# Patient Record
Sex: MALE | Race: WHITE | NOT HISPANIC OR LATINO | Employment: FULL TIME | URBAN - METROPOLITAN AREA
[De-identification: names, ages, dates, MRNs, and addresses within clinical notes are randomized per-mention and may not be internally consistent; named-entity substitution may affect disease eponyms.]

---

## 2018-04-09 ENCOUNTER — OFFICE VISIT (OUTPATIENT)
Dept: BARIATRICS | Facility: CLINIC | Age: 28
End: 2018-04-09

## 2018-04-09 VITALS
WEIGHT: 315 LBS | BODY MASS INDEX: 41.75 KG/M2 | SYSTOLIC BLOOD PRESSURE: 110 MMHG | HEIGHT: 73 IN | HEART RATE: 70 BPM | DIASTOLIC BLOOD PRESSURE: 80 MMHG | TEMPERATURE: 97.9 F

## 2018-04-09 DIAGNOSIS — E66.01 MORBID (SEVERE) OBESITY DUE TO EXCESS CALORIES (HCC): ICD-10-CM

## 2018-04-09 DIAGNOSIS — E66.9 OBESITY, CLASS I, BMI 30-34.9: Primary | ICD-10-CM

## 2018-04-09 DIAGNOSIS — E66.01 MORBID (SEVERE) OBESITY DUE TO EXCESS CALORIES (HCC): Primary | ICD-10-CM

## 2018-04-09 DIAGNOSIS — Z98.84 BARIATRIC SURGERY STATUS: ICD-10-CM

## 2018-04-09 PROCEDURE — RECHECK

## 2018-04-09 RX ORDER — ESCITALOPRAM OXALATE 10 MG/1
10 TABLET ORAL
COMMUNITY
Start: 2018-03-08 | End: 2019-07-15

## 2018-04-09 RX ORDER — ESOMEPRAZOLE MAGNESIUM 40 MG/1
40 CAPSULE, DELAYED RELEASE ORAL
Refills: 3 | COMMUNITY
Start: 2018-03-08

## 2018-04-09 RX ORDER — LISINOPRIL 10 MG/1
TABLET ORAL
COMMUNITY
Start: 2018-04-03

## 2018-04-09 NOTE — PROGRESS NOTES
Bariatric Behavioral Health Evaluation    Presenting Problem  Patient presents today with concerns to increase overall health, increase mobility, decrease weight and prevent family disease  Is the patient seeking Bariatric Surgery Eval? Yes  If yes how long have you researched this surgery option  Patient has been thinking about surgery for 3 years; patient's brother in law and good friend had surgery and had good results  Realizes Post- Op Requirements? Yes patient has hypertension  Pre-morbid level of function and history of present illness: Multiple health issues  Psychiatric/Psychological Treatment Diagnosis: Patient reports Axis I diagnosis of anxiety  Patient treated by PCP with medication  Outpatient Counselor No     Psychiatrist no  Have you had Inpatient Treatment? No    Family Constellation (include relationship with each and Psych/Med HX)    Father history of obesity and alcohol; sister history of obesity  Domestic Violence No    Additional comments/stressors related to family/relationships/peer support has supportive family and friends; house hunting  Physical/Psychological Assessment:     Appearance: appropriate  Sociability: average  Affect: appropriate  Mood: calm  Thought Process: coherent  Speech: normal  Content: no impairment  Orientation: person  Yes , place  Yes , time  Yes , normal attention span  Yes , normal memory  Yes   and normal judgement  Yes   Insight: emotional  good    Risk Assessment:     none    Recommendations: Patient is recommended for surgery  Risk of Harm to Self or Others: None reported  Observation:     Interviews this interview only  Access to weapons yes     Weapons secured by Secure in gun safe with lock and key       Based on the previous information, the client presents the following risk of harm to self or others: low    BARIATRIC [place]    I have received education related to my bariatric surgery process and understand:    Patients may be required to complete a psychiatric evaluation and receive clearance for surgery from their psychiatrist     Patients who undergo weight loss surgery are at higher risk of increased mental health concerns and suicide attempts  Patients may be required to complete a full substance abuse evaluation and then complete all treatment recommendations prior to surgery  If diagnosis of abuse/dependence results, patient may be required to remain sober for one (1) year before having bariatric surgery  Patients on psychiatric medications should check with their provider to discuss psychiatric medications and the changes in absorption  Patient should discuss all time release medications with provider and take all medications as prescribed  The recommendation is that there is no use of  any tobacco products, Hookah or  vapes for the bariatric post-operation patient  Bariatric surgery patients should not consume alcohol as a post-operative patient as it may increase risk of numerous health conditions including but not limited to alcohol abuse and ulcers  There is a possibility of weight regain if patient does not follow all program guidelines and recommendations  Bariatric surgery patients should exercise thirty (30) to sixty (60) minutes per day to maintain post-surgical weight loss  Research indicates that bariatric patients are more successful when they see a therapist for up to two (2) years post-op  Patients will follow all medical and dietary recommendations provided  Patient will keep all scheduled appointments and follow up with their physician for a minimum of five (5) years  Patient will take all vitamins as recommended  Post-operative vitamins are life-long  Patient reviewed Bariatric Surgery Education Checklist and agrees they have received education on these issues       Note Patient reports Axis I diagnosis of anxiety that is treated by his PCP with medication  Patient educated the impact of nicotine and alcohol on the post bariatric surgery patient  Patient has a positive history of obesity and alcohol  Patient meets criteria for surgery at this program and is referred to the physician

## 2018-04-09 NOTE — PROGRESS NOTES
Bariatric Nutrition Assessment Note    Type of surgery    Gastric bypass: laparoscopic  Surgery Date: TBD  Surgeon: TBD    Nutrition Assessment   Moe Lance  32 y o   male     Wt with BMI of 25: 186 6  Pre-Op Excess Wt: 163 8  350 4lbs 6'  5"    Weight History   Onset of Obesity: Childhood  Family history of obesity: Yes  Wt Loss Attempts: Commercial Programs ([employer], [employer], etc )  Exercise  High Protein/Low CHO diets (Atkins, Union, etc )  Self Created Diets (Portion Control, Healthy Food Choices, etc )  Maximum Wt Lost: -25lbs    Review of History and Medications   No past medical history on file  No past surgical history on file  Social History     Social History    Marital status: /Civil Union     Spouse name: N/A    Number of children: N/A    Years of education: N/A     Social History Main Topics    Smoking status: Not on file    Smokeless tobacco: Not on file    Alcohol use Not on file    Drug use: Unknown    Sexual activity: Not on file     Other Topics Concern    Not on file     Social History Narrative    No narrative on file     No current outpatient prescriptions on file    Food Intake and Lifestyle Assessment   Food Intake Assessment completed via food log brought by patient  Breakfast: 2 eggs, 1 slice of whole wheat toast with almond butter and 1/2 banana  Snack: carrots and hummus   Lunch: 2 pieces of bread with turkey and 1/2 banana or salad with chicken and dressing and 1/2 banana or chicken caesar wrap from subway  Snack: fruit  Dinner: protein (fish or chicken or ground turkey), starch (whole wheat pasta or rice or mashed potatoes) and veggie (broccoli, green beans, corn)  Snack: nuts  Beverage intake: only seltzer water  Protein supplement: none at this time  Estimated protein intake per day: >80gm   Estimated fluid intake per day: >64oz of seltzer water  Meals eaten away from home: 2 per month  Typical meal pattern: 3 meals per day and 3 snacks per day  Eating Behaviors: Consumption of high calorie beverages on occasion will have a body armor drink (200 cals/8oz), Large portion sizes and Craves salty foods  Food allergies or intolerances: Allergies not on file  Cultural or Sikhism considerations: n/a    Physical Assessment  Physical Activity  Types of exercise: Walking (getting between 5000-11,000 steps per day  Current physical limitations: none    Psychosocial Assessment   Support systems: spouse friend(s) relative(s)  Socioeconomic factors: none    Nutrition Diagnosis  Diagnosis: Overweight / Obesity (NC-3 3), Excessive energy intake (NI-1 5) and Inappropriate intake of carbohydrates (NI-5 8  3)  Related to: Physical inactivity and Excessive energy intake  As Evidenced by: BMI >25 and Excessive energy intake     Nutrition Prescription: Recommend the following diet  Regular    Interventions and Teaching   Discussed pre-op and post-op nutrition guidelines  Patient educated and handouts provided    Surgical changes to stomach / GI  Capacity of post-surgery stomach  Diet progression  Adequate hydration  Sugar and fat restriction to decrease "dumping syndrome"  Expected weight loss  Weight loss plateaus/ possibility of weight regain  Exercise  Suggestions for pre-op diet  Meal planning and preparation  Appropriate carbohydrate, protein, and fat intake, and food/fluid choices to maximize safe weight loss, nutrient intake, and tolerance   Dietary and lifestyle changes  Possible problems with poor eating habits  Techniques for self monitoring and keeping daily food journal  Potential for food intolerance after surgery, and ways to deal with them including: lactose intolerance, nausea, reflux, vomiting, diarrhea, food intolerance, appetite changes, gas  Vitamin / Mineral supplementation of Multivitamin with minerals and Vitamin D    Education provided to: patient    Barriers to learning: No barriers identified  Readiness to change: preparation    Prior research on procedure: internet, discussed with provider and friends or family    Comprehension: verbalizes understanding     Expected Compliance: good  Recommendations  Pt is an appropriate candidate for surgery   Yes    Evaluation / Monitoring  Dietitian to Monitor: Eating pattern as discussed Tolerance of nutrition prescription Body weight Physical activity    Goals  Food journal, Exercise 30 minutes 5 times per week, Complete lession plans 1-6, Eat 3 meals per day and Eliminate mindless snacking    Time Spent:   1 Hour

## 2018-05-18 ENCOUNTER — OFFICE VISIT (OUTPATIENT)
Dept: BARIATRICS | Facility: CLINIC | Age: 28
End: 2018-05-18
Payer: COMMERCIAL

## 2018-05-18 VITALS
DIASTOLIC BLOOD PRESSURE: 74 MMHG | SYSTOLIC BLOOD PRESSURE: 130 MMHG | WEIGHT: 315 LBS | BODY MASS INDEX: 41.75 KG/M2 | TEMPERATURE: 98.5 F | HEIGHT: 73 IN | HEART RATE: 75 BPM

## 2018-05-18 DIAGNOSIS — E66.01 MORBID (SEVERE) OBESITY DUE TO EXCESS CALORIES (HCC): Primary | ICD-10-CM

## 2018-05-18 DIAGNOSIS — K21.00 GASTROESOPHAGEAL REFLUX DISEASE WITH ESOPHAGITIS: ICD-10-CM

## 2018-05-18 DIAGNOSIS — I10 ESSENTIAL HYPERTENSION: ICD-10-CM

## 2018-05-18 DIAGNOSIS — R73.03 PRE-DIABETES: ICD-10-CM

## 2018-05-18 PROCEDURE — 99204 OFFICE O/P NEW MOD 45 MIN: CPT | Performed by: SURGERY

## 2018-05-18 RX ORDER — MULTIVIT WITH MINERALS/LUTEIN
1000 TABLET ORAL DAILY
COMMUNITY

## 2018-05-18 NOTE — LETTER
May 18, 2018     Liam Asencio, [address] [facility] [address] [address]    Patient: Radha Shea   YOB: 1990   Date of Visit: 5/18/2018       Dear Dr Neetu Caraballo: Thank you for referring Radha Shea to me for evaluation  Below are my notes for this consultation  If you have questions, please do not hesitate to call me  I look forward to following your patient along with you  Sincerely,        Jossy Jones MD        CC: No Recipients  Jossy Jones MD  5/18/2018  1:34 PM  Sign at close encounter      [address] [facility] [address] [facility] 32 y o  male MRN: [id number]  Unit/Bed#:  Encounter: [id number]      HPI:  Radha Shea is a 32 y o  male who presents with a longstanding history of morbid obesity and inability to sustain a meaningful weight loss  Here today to discuss bariatric options  Visit type: initial visit    Symptoms: inability to loss weight    Associated Symptoms: anxiety    Associated Conditions: abdominal obesity  Disease Complications: none  Weight Loss Interest: high  Previous Diet Trials: low calorie     Exercise Frequency:sedentary  Types of Exercise: walking    Review of Systems   All other systems reviewed and are negative    GI:  He has pretty severe heartburn    Historical Information   Past Medical History:   Diagnosis Date    Anxiety     Digestive disorder     Hypertension      Past Surgical History:   Procedure Laterality Date    COLONOSCOPY      CYST REMOVAL       Social History   History   Alcohol Use    Yes     History   Drug Use No     History   Smoking Status    Former Smoker   Smokeless Tobacco    Former User     Family History: non-contributory    Meds/Allergies   all medications and allergies reviewed  Allergies   Allergen Reactions    Cephalexin Other (See Comments) and Swelling     "swelling of hands & arms & hot flashes "       Objective     Current Vitals:   Blood Pressure: 130/74 (05/18/18 1309)  Pulse: 75 (05/18/18 1309)  Temperature: 98 5 °F (36 9 °C) (05/18/18 1309)  Height: 6' 0 5" (184 2 cm) (05/18/18 1309)  Weight - Scale: (!) 157 kg (345 lb 8 oz) (05/18/18 1309)    [unfilled]    Invasive Devices          No matching active lines, drains, or airways          Physical Exam   Constitutional: He is oriented to person, place, and time  He appears well-developed and well-nourished  No distress  Neurological: He is alert and oriented to person, place, and time  Psychiatric: He has a normal mood and affect  His behavior is normal  Judgment and thought content normal        Lab Results: I have personally reviewed pertinent lab results  Imaging: I have personally reviewed pertinent reports  EKG, Pathology, and Other Studies: I have personally reviewed pertinent reports  An EGD was performed a couple months ago and revealed -esophagus: ss Fields's 39 to 40 cm, no esophagitis, otherwise normal  -stomach: normal   -duodenum: normal  A   DISTAL ESOPHAGUS BIOPSY:    Gastric mucosa with mild chronic inflammation     No intestinal (goblet cell) metaplasia or dysplasia seen     Adjacent squamous mucosa with rare intraepithelial eosinophils seen  (up to 1 per high-power field)  Assessment/PLAN:    32 y o  yo male with a long standing h/o of obesity and inability to sustain any meaningful weight loss on his own despite several attempts  He is interested in the Laparoscopic Gastric Bypass  As a part of his pre op evaluation, he will be referred to a cardiologist and for a sleep evaluation and consult  I have spent over 45 minutes with him face to face in the office today discussing his options and details of the surgery  We have seen an animation of the surgery on the computer that illustrates how the operation is done and how the anatomy will be altered with the procedure  Over 50% of this was coordinating care      I have discussed and educated the patient with regards to the components of our multidisciplinary program and the importance of compliance and follow up in the post operative period  He was given the opportunity to ask questions and I have answered all of them  The patient was also instructed with regards to the importance of behavior modification, nutritional counseling, support meeting attendance and lifestyle changes that are important to ensure success  Although there is a great statistical chance of improvement or even resolution of most of his associated comorbidities, the results vary from patient to patient and they largely depend on his commitment and compliance  He needs to lose 17 lbs prior to the operation        Kody Bose MD  5/18/2018  1:30 PM

## 2018-05-18 NOTE — PROGRESS NOTES
BARIATRIC INITIAL CONSULT - BARIATRIC SURGERY    Dia Lance 32 y o  male MRN: [id number]  Unit/Bed#:  Encounter: [id number]      HPI:  Samantha Hayden is a 32 y o  male who presents with a longstanding history of morbid obesity and inability to sustain a meaningful weight loss  Here today to discuss bariatric options  Visit type: initial visit    Symptoms: inability to loss weight    Associated Symptoms: anxiety    Associated Conditions: abdominal obesity  Disease Complications: none  Weight Loss Interest: high  Previous Diet Trials: low calorie     Exercise Frequency:sedentary  Types of Exercise: walking    Review of Systems   All other systems reviewed and are negative  GI:  He has pretty severe heartburn    Historical Information   Past Medical History:   Diagnosis Date    Anxiety     Digestive disorder     Hypertension      Past Surgical History:   Procedure Laterality Date    COLONOSCOPY      CYST REMOVAL       Social History   History   Alcohol Use    Yes     History   Drug Use No     History   Smoking Status    Former Smoker   Smokeless Tobacco    Former User     Family History: non-contributory    Meds/Allergies   all medications and allergies reviewed  Allergies   Allergen Reactions    Cephalexin Other (See Comments) and Swelling     "swelling of hands & arms & hot flashes "       Objective     Current Vitals:   Blood Pressure: 130/74 (05/18/18 1309)  Pulse: 75 (05/18/18 1309)  Temperature: 98 5 °F (36 9 °C) (05/18/18 1309)  Height: 6' 0 5" (184 2 cm) (05/18/18 1309)  Weight - Scale: (!) 157 kg (345 lb 8 oz) (05/18/18 1309)    [unfilled]    Invasive Devices          No matching active lines, drains, or airways          Physical Exam   Constitutional: He is oriented to person, place, and time  He appears well-developed and well-nourished  No distress  Neurological: He is alert and oriented to person, place, and time  Psychiatric: He has a normal mood and affect   His behavior is normal  Judgment and thought content normal        Lab Results: I have personally reviewed pertinent lab results  Imaging: I have personally reviewed pertinent reports  EKG, Pathology, and Other Studies: I have personally reviewed pertinent reports  An EGD was performed a couple months ago and revealed -esophagus: ss Fields's 39 to 40 cm, no esophagitis, otherwise normal  -stomach: normal   -duodenum: normal  A   DISTAL ESOPHAGUS BIOPSY:    Gastric mucosa with mild chronic inflammation     No intestinal (goblet cell) metaplasia or dysplasia seen     Adjacent squamous mucosa with rare intraepithelial eosinophils seen  (up to 1 per high-power field)  Assessment/PLAN:    32 y o  yo male with a long standing h/o of obesity and inability to sustain any meaningful weight loss on his own despite several attempts  He is interested in the Laparoscopic Gastric Bypass  As a part of his pre op evaluation, he will be referred to a cardiologist and for a sleep evaluation and consult  I have spent over 45 minutes with him face to face in the office today discussing his options and details of the surgery  We have seen an animation of the surgery on the computer that illustrates how the operation is done and how the anatomy will be altered with the procedure  Over 50% of this was coordinating care  I have discussed and educated the patient with regards to the components of our multidisciplinary program and the importance of compliance and follow up in the post operative period  He was given the opportunity to ask questions and I have answered all of them  The patient was also instructed with regards to the importance of behavior modification, nutritional counseling, support meeting attendance and lifestyle changes that are important to ensure success      Although there is a great statistical chance of improvement or even resolution of most of his associated comorbidities, the results vary from patient to patient and they largely depend on his commitment and compliance  He needs to lose 17 lbs prior to the operation        Brianne Morales MD  5/18/2018  1:30 PM

## 2018-06-26 ENCOUNTER — OFFICE VISIT (OUTPATIENT)
Dept: SLEEP CENTER | Facility: CLINIC | Age: 28
End: 2018-06-26
Payer: COMMERCIAL

## 2018-06-26 VITALS
DIASTOLIC BLOOD PRESSURE: 68 MMHG | HEIGHT: 73 IN | SYSTOLIC BLOOD PRESSURE: 134 MMHG | HEART RATE: 70 BPM | WEIGHT: 315 LBS | BODY MASS INDEX: 41.75 KG/M2

## 2018-06-26 DIAGNOSIS — Z91.89 AT RISK FOR OBSTRUCTIVE SLEEP APNEA: Primary | ICD-10-CM

## 2018-06-26 DIAGNOSIS — E66.01 MORBID (SEVERE) OBESITY DUE TO EXCESS CALORIES (HCC): ICD-10-CM

## 2018-06-26 DIAGNOSIS — F41.9 ANXIETY: ICD-10-CM

## 2018-06-26 DIAGNOSIS — I10 ESSENTIAL HYPERTENSION: ICD-10-CM

## 2018-06-26 DIAGNOSIS — K21.9 GASTROESOPHAGEAL REFLUX DISEASE, ESOPHAGITIS PRESENCE NOT SPECIFIED: ICD-10-CM

## 2018-06-26 PROCEDURE — 99244 OFF/OP CNSLTJ NEW/EST MOD 40: CPT | Performed by: INTERNAL MEDICINE

## 2018-06-26 NOTE — PROGRESS NOTES
Review of Systems      Genitourinary none   Cardiology none   Gastrointestinal none   Neurology awaken with headache   Constitutional none   Integumentary none   Psychiatry anxiety   Musculoskeletal legs twitching/jerking   Pulmonary none   ENT none   Endocrine none   Hematological none

## 2018-06-26 NOTE — PROGRESS NOTES
Consultation - [address]  32 y o  male  :1990  EEI:[id number]    Physician Requesting Consult: Chon Rust MD     Reason for Consult : [At your kind request] I saw this patient for initial sleep evaluation today  The patient is planning Bariatric surgery and is here to also evaluate for Obstructive Sleep Apnea  PFSH, Problem List, Medications & Allergies were reviewed in EMR  He  has a past medical history of Anxiety; Digestive disorder; and Hypertension  He has a current medication list which includes the following prescription(s): vitamin c, cholecalciferol, cyanocobalamin, escitalopram, esomeprazole, krill oil, lisinopril, and multiple vitamins-minerals  HPI:  He reports no snoring or breathing difficulties during sleep  Other Complaints:  Morning headaches butInfrequently at 1-2 times a month  Restless Leg Syndrome:reports no typical symptoms that are suggestive, but has twitching of his body as he falls asleep and at times may awaken him from sleep  Parasomnia: [No features of parasomnia ]  Sleep Routine: [Is regular ] Bedtime typically between 11:00 p m  and awakens 8:00 a m  He usually falls asleep in < 15 minutes  Sleep is interrupted 2 x / night  [He is spending approximately 9 hours in bed and estimates getting 8 - 9 hours sleep ] He awakens with the aid of an alarm feeling refreshed  He  denies excessive daytime drowsiness [ ]  He self rated at Total score: 3 /24 on the Opdyke Sleepiness Scale  Habits:[ reports that he has quit smoking  He has quit using smokeless tobacco ], [ reports that he drinks alcohol ], [ reports that he does not use drugs  ], Caffeine use: none, Exercise routine: regular by walking  Family History:   Several famil members have body twitches but no diagnosis of restless leg syndrome  ROS: reviewed & as attached  [Significant for approximately 20 lb weight gain in the past 1 year    He feels anxiety and acid reflux are controlled on current medication ]     EXAM: key  [x] system is Normal  [] see notes  VITALS     []  /68   Pulse 70   Ht 6' 0 5" (1 842 m)   Wt (!) 164 kg (362 lb)   BMI 48 42 kg/m²     GENERAL[x]  [Well] groomed male, well appearing, [at] his stated age, in [no apparent] distress  PSYCH     [x]  Alert, orientated and cooperative  Speech is clear and coherent  Mental state  appears normal    EXTREM/  SKIN         [x]  [No] pedal edema  Skin is warm and dry  Color and hydration are good  HEAD       [x]     Craniofacial anatomy: normal]  EOMI  TMJ & Sinuses are normal    Neck         []  [Neck Circumference: 43 (cm) cm], appears thick and muscular; [no] abnormal masses [or] Lymhadenopathy  Thyroid is [normal]  Trachea is [central]  Nasal        []  Airway is patent Septum is [central], Mucous membranes appear [normal]  Turbinates are [normal ] There is [no] rhinorrhea  Oral          []    Airway       [Is crowded,] Modified Mallampati class IV (only hard palate visible)  Palate:  redundant soft palate  There is asymmetric tonsillar hypertrophy 2+ on the left  Teeth normal      CVS         [x]  Heart sounds are [regular], [No] murmur[s]  RESP       [x]  Effort is [normal]  Air entry [good] [bilaterally]  [No]wheezes  [No]rales  ABD         [x]  obese, soft & non-tender  CNS         [x]  Grossly intact  Normal gait  Rombergs [Negative]  MSK         [x]  Muscle bulk, tone and power  [normal]          IMPRESSION:   1  At risk for obstructive sleep apnea  Home Study   2  Morbid (severe) obesity due to excess calories [facility])  Ambulatory referral to Sleep Medicine    Home Study   3  Essential hypertension     4  Anxiety     5  Gastroesophageal reflux disease, esophagitis presence not specified          PLAN:   1   With respect to above conditions, I counseled on pathophysiology, diagnosis, treatment options, risks and benefits; interrelationship and effects on symptoms and comorbidities; risks of no treatment; costs and insurance aspects  2  I advised on weight reduction, avoiding sleeping supine, using alcohol or sedating medications close to bed time and on safe driving practices  3  Nocturnal polysomnography is indicated and a diagnostic home sleep study will be scheduled  4  Patient is averse to trying Positive airway pressure therapy  5  Follow-up will be scheduled after the study to review results, further details of treatment options and to initiate/adjust therapy  Thank you for allowing me to participate in the care of this patient  I will keep you apprised of developments      Sincerely,     Authenticated electronically by Leslie Nelson MD   on 16/92/78   Board Certified Specialist

## 2018-08-10 ENCOUNTER — HOSPITAL ENCOUNTER (OUTPATIENT)
Dept: SLEEP CENTER | Facility: CLINIC | Age: 28
Discharge: HOME | End: 2018-08-10
Payer: COMMERCIAL

## 2018-08-10 DIAGNOSIS — Z91.89 AT RISK FOR OBSTRUCTIVE SLEEP APNEA: ICD-10-CM

## 2018-08-10 DIAGNOSIS — E66.01 MORBID (SEVERE) OBESITY DUE TO EXCESS CALORIES (HCC): ICD-10-CM

## 2018-08-10 PROCEDURE — G0399 HOME SLEEP TEST/TYPE 3 PORTA: HCPCS

## 2018-08-15 ENCOUNTER — TELEPHONE (OUTPATIENT)
Dept: SLEEP CENTER | Facility: CLINIC | Age: 28
End: 2018-08-15

## 2018-08-15 NOTE — TELEPHONE ENCOUNTER
Left message to keep follow up on 8/28 as scheduled to discuss results of sleep study  Instructed to call if any questions prior to that appointment

## 2019-07-15 ENCOUNTER — OFFICE VISIT (OUTPATIENT)
Dept: URGENT CARE | Facility: MEDICAL CENTER | Age: 29
End: 2019-07-15
Payer: COMMERCIAL

## 2019-07-15 VITALS
TEMPERATURE: 98.1 F | BODY MASS INDEX: 41.75 KG/M2 | SYSTOLIC BLOOD PRESSURE: 139 MMHG | OXYGEN SATURATION: 98 % | HEIGHT: 73 IN | WEIGHT: 315 LBS | HEART RATE: 105 BPM | RESPIRATION RATE: 16 BRPM | DIASTOLIC BLOOD PRESSURE: 79 MMHG

## 2019-07-15 DIAGNOSIS — J06.9 ACUTE URI: Primary | ICD-10-CM

## 2019-07-15 DIAGNOSIS — K52.9 NONINFECTIOUS GASTROENTERITIS, UNSPECIFIED TYPE: ICD-10-CM

## 2019-07-15 PROCEDURE — G0382 LEV 3 HOSP TYPE B ED VISIT: HCPCS | Performed by: FAMILY MEDICINE

## 2019-07-15 RX ORDER — ONDANSETRON 4 MG/1
4 TABLET, ORALLY DISINTEGRATING ORAL EVERY 6 HOURS PRN
Qty: 20 TABLET | Refills: 0 | Status: SHIPPED | OUTPATIENT
Start: 2019-07-15

## 2019-07-15 RX ORDER — DIPHENOXYLATE HYDROCHLORIDE AND ATROPINE SULFATE 2.5; .025 MG/1; MG/1
1 TABLET ORAL 4 TIMES DAILY PRN
Qty: 30 TABLET | Refills: 0 | Status: SHIPPED | OUTPATIENT
Start: 2019-07-15

## 2019-07-15 RX ORDER — BENZONATATE 100 MG/1
100 CAPSULE ORAL 3 TIMES DAILY PRN
Qty: 20 CAPSULE | Refills: 0 | Status: SHIPPED | OUTPATIENT
Start: 2019-07-15

## 2019-07-15 NOTE — LETTER
July 15, 2019     Patient: Dipika Nuñez   YOB: 1990   Date of Visit: 7/15/2019       To Whom It May Concern: It is my medical opinion that Dipika Nuñez may return to work on 7/17/2019  If you have any questions or concerns, please don't hesitate to call           Sincerely,        Adrian Perry MD    CC: No Recipients

## 2019-07-16 NOTE — PROGRESS NOTES
[facility]        NAME: Dhruv Salter is a 29 y o  male  : 1990    MRN: [id number]  DATE: July 15, 2019  TIME: 9:24 PM    Assessment and Plan   Acute URI [J06 9]  1  Acute URI  benzonatate (TESSALON PERLES) 100 mg capsule   2  Noninfectious gastroenteritis, unspecified type  ondansetron (ZOFRAN-ODT) 4 mg disintegrating tablet    diphenoxylate-atropine (LOMOTIL) 2 5-0 025 mg per tablet         Patient Instructions       Follow up with PCP in 3-5 days  Proceed to  ER if symptoms worsen  Chief Complaint     Chief Complaint   Patient presents with    Diarrhea     diarrhea with nasal congestion and cough  states 12 pound weight loss since friday         History of Present Illness       Patient with 3 day history of loose watery diarrhea  Loose watery up to 10 episodes in a day not accompanied by blood or mucus  Denies any abdominal pain  Refers to some mild nausea but no vomiting  He has been tolerating fluids quite well  He believes he has lost at least 12 lb in the last 3 days  He is also here with URI symptoms mainly consisting of nasal congestion, some postnasal drip and nonproductive cough denies shortness of breath or wheeze  He is taking no medication for his current symptoms  Denies any recent sick exposures  Review of Systems   Review of Systems   Constitutional: Negative  HENT: Positive for congestion  Respiratory: Positive for cough  Gastrointestinal: Positive for diarrhea and nausea           Current Medications       Current Outpatient Medications:     Ascorbic Acid (VITAMIN C) 1000 MG tablet, Take 1,000 mg by mouth daily, Disp: , Rfl:     Cholecalciferol (VITAMIN D PO), Take by mouth, Disp: , Rfl:     Cyanocobalamin (VITAMIN B-12 PO), Take by mouth, Disp: , Rfl:     escitalopram (LEXAPRO) 10 mg tablet, Take 10 mg by mouth, Disp: , Rfl:     esomeprazole (NexIUM) 40 MG capsule, Take 40 mg by mouth daily before breakfast, Disp: , Rfl: 3    KRILL OIL PO, Take by mouth, Disp: , Rfl:     lisinopril (ZESTRIL) 10 mg tablet, , Disp: , Rfl:     Multiple Vitamins-Minerals (MULTI COMPLETE PO), Take by mouth, Disp: , Rfl:     benzonatate (TESSALON PERLES) 100 mg capsule, Take 1 capsule (100 mg total) by mouth 3 (three) times a day as needed for cough, Disp: 20 capsule, Rfl: 0    diphenoxylate-atropine (LOMOTIL) 2 5-0 025 mg per tablet, Take 1 tablet by mouth 4 (four) times a day as needed for diarrhea, Disp: 30 tablet, Rfl: 0    ondansetron (ZOFRAN-ODT) 4 mg disintegrating tablet, Take 1 tablet (4 mg total) by mouth every 6 (six) hours as needed for nausea or vomiting, Disp: 20 tablet, Rfl: 0    Current Allergies     Allergies as of 07/15/2019 - Reviewed 07/15/2019   Allergen Reaction Noted    Cephalexin Other (See Comments) and Swelling 09/26/2017            The following portions of the patient's history were reviewed and updated as appropriate: allergies, current medications, past family history, past medical history, past social history, past surgical history and problem list      Past Medical History:   Diagnosis Date    Anxiety     Digestive disorder     Hypertension        Past Surgical History:   Procedure Laterality Date    COLONOSCOPY      CYST REMOVAL         Family History   Problem Relation Age of Onset    Hypertension Father          Medications have been verified  Objective   /79   Pulse 105   Temp 98 1 °F (36 7 °C) (Tympanic)   Resp 16   Ht 6' 1" (1 854 m)   Wt (!) 156 kg (343 lb)   SpO2 98%   BMI 45 25 kg/m²        Physical Exam     Physical Exam   HENT:   Nose: Nose normal    Mouth/Throat: Oropharynx is clear and moist    Neck: Normal range of motion  Cardiovascular: Normal rate, regular rhythm and normal heart sounds  Pulmonary/Chest: Effort normal and breath sounds normal    Abdominal: Soft  Bowel sounds are normal    Vitals reviewed

## 2019-07-16 NOTE — PATIENT INSTRUCTIONS
I prescribed Zofran 4 mg ODT q 6 hours p r n , Lomotil tablets as needed for diarrhea and Tessalon Perles 100 mg q 8 hours  Patient advised to increase fluid intake maintain clear liquid diet for 24 hours before advancing as tolerated  Gastroenteritis   WHAT YOU NEED TO KNOW:   Gastroenteritis, or stomach flu, is an infection of the stomach and intestines  DISCHARGE INSTRUCTIONS:   Call 911 for any of the following:   · You have trouble breathing or a very fast pulse  Return to the emergency department if:   · You see blood in your diarrhea  · You cannot stop vomiting  · You have not urinated for 12 hours  · You feel like you are going to faint  Contact your healthcare provider if:   · You have a fever  · You continue to vomit or have diarrhea, even after treatment  · You see worms in your diarrhea  · Your mouth or eyes are dry  You are not urinating as much or as often  · You have questions or concerns about your condition or care  Medicines:   · Medicines  may be given to stop vomiting or diarrhea, decrease abdominal cramps, or treat an infection  · Take your medicine as directed  Contact your healthcare provider if you think your medicine is not helping or if you have side effects  Tell him or her if you are allergic to any medicine  Keep a list of the medicines, vitamins, and herbs you take  Include the amounts, and when and why you take them  Bring the list or the pill bottles to follow-up visits  Carry your medicine list with you in case of an emergency  Manage your symptoms:   · Drink liquids as directed  Ask your healthcare provider how much liquid to drink each day, and which liquids are best for you  You may also need to drink an oral rehydration solution (ORS)  An ORS has the right amounts of sugar, salt, and minerals in water to replace body fluids  · Eat bland foods  When you feel hungry, begin eating soft, bland foods   Examples are bananas, clear soup, potatoes, and applesauce  Do not have dairy products, alcohol, sugary drinks, or drinks with caffeine until you feel better  · Rest as much as possible  Slowly start to do more each day when you begin to feel better  Prevent the spread of gastroenteritis:  Gastroenteritis can spread easily  Keep yourself, your family, and your surroundings clean to help prevent the spread of gastroenteritis:  · Wash your hands often  Use soap and water  Wash your hands after you use the bathroom, change a child's diapers, or sneeze  Wash your hands before you prepare or eat food  · Clean surfaces and do laundry often  Wash your clothes and towels separately from the rest of the laundry  Clean surfaces in your home with antibacterial  or bleach  · Clean food thoroughly and cook safely  Wash raw vegetables before you cook  Cook meat, fish, and eggs fully  Do not use the same dishes for raw meat as you do for other foods  Refrigerate any leftover food immediately  · Be aware when you camp or travel  Drink only clean water  Do not drink from rivers or lakes unless you purify or boil the water first  When you travel, drink bottled water and do not add ice  Do not eat fruit that has not been peeled  Do not eat raw fish or meat that is not fully cooked  Follow up with your healthcare provider as directed:  Write down your questions so you remember to ask them during your visits  © 2017 [employer] Information is for End User's use only and may not be sold, redistributed or otherwise used for commercial purposes  All illustrations and images included in CareNotes® are the copyrighted property of A D A M [employer] Inc  or [employer]io Horowitz  The above information is an  only  It is not intended as medical advice for individual conditions or treatments  Talk to your doctor, nurse or pharmacist before following any medical regimen to see if it is safe and effective for you

## 2021-03-25 ENCOUNTER — HOSPITAL ENCOUNTER (EMERGENCY)
Facility: HOSPITAL | Age: 31
Discharge: HOME | End: 2021-03-25
Attending: EMERGENCY MEDICINE | Admitting: EMERGENCY MEDICINE
Payer: COMMERCIAL

## 2021-03-25 ENCOUNTER — APPOINTMENT (EMERGENCY)
Dept: RADIOLOGY | Facility: HOSPITAL | Age: 31
End: 2021-03-25
Payer: COMMERCIAL

## 2021-03-25 VITALS
OXYGEN SATURATION: 98 % | RESPIRATION RATE: 18 BRPM | HEART RATE: 84 BPM | TEMPERATURE: 97.1 F | BODY MASS INDEX: 44.79 KG/M2 | SYSTOLIC BLOOD PRESSURE: 129 MMHG | DIASTOLIC BLOOD PRESSURE: 61 MMHG | WEIGHT: 315 LBS

## 2021-03-25 DIAGNOSIS — J06.9 URI (UPPER RESPIRATORY INFECTION): Primary | ICD-10-CM

## 2021-03-25 DIAGNOSIS — R05.9 COUGH: ICD-10-CM

## 2021-03-25 DIAGNOSIS — R11.10 VOMITING: ICD-10-CM

## 2021-03-25 LAB
ALBUMIN SERPL BCP-MCNC: 4.6 G/DL (ref 3–5.2)
ALP SERPL-CCNC: 120 U/L (ref 43–122)
ALT SERPL W P-5'-P-CCNC: 30 U/L
ANION GAP SERPL CALCULATED.3IONS-SCNC: 10 MMOL/L (ref 5–14)
AST SERPL W P-5'-P-CCNC: 20 U/L (ref 17–59)
BILIRUB SERPL-MCNC: 0.5 MG/DL
BUN SERPL-MCNC: 14 MG/DL (ref 5–25)
CALCIUM SERPL-MCNC: 9.1 MG/DL (ref 8.4–10.2)
CHLORIDE SERPL-SCNC: 98 MMOL/L (ref 97–108)
CO2 SERPL-SCNC: 28 MMOL/L (ref 22–30)
CREAT SERPL-MCNC: 0.61 MG/DL (ref 0.7–1.5)
ERYTHROCYTE [DISTWIDTH] IN BLOOD BY AUTOMATED COUNT: 12.8 %
GFR SERPL CREATININE-BSD FRML MDRD: 134 ML/MIN/1.73SQ M
GLUCOSE SERPL-MCNC: 288 MG/DL (ref 70–99)
HCT VFR BLD AUTO: 45.3 % (ref 41–53)
HGB BLD-MCNC: 15.5 G/DL (ref 13.5–17.5)
LYMPHOCYTES # BLD AUTO: 4.8 THOUSAND/UL (ref 0.5–4)
LYMPHOCYTES # BLD AUTO: 48 % (ref 25–45)
MCH RBC QN AUTO: 32.3 PG (ref 26–34)
MCHC RBC AUTO-ENTMCNC: 34.3 G/DL (ref 31–36)
MCV RBC AUTO: 94 FL (ref 80–100)
MONOCYTES # BLD AUTO: 0.8 THOUSAND/UL (ref 0.2–0.9)
MONOCYTES NFR BLD AUTO: 8 % (ref 1–10)
NEUTS BAND NFR BLD MANUAL: 2 % (ref 0–8)
NEUTS SEG # BLD: 4.4 THOUSAND/UL (ref 1.8–7.8)
NEUTS SEG NFR BLD AUTO: 42 %
PLATELET # BLD AUTO: 373 THOUSANDS/UL (ref 150–450)
PLATELET BLD QL SMEAR: ADEQUATE
PMV BLD AUTO: 6.8 FL (ref 8.9–12.7)
POTASSIUM SERPL-SCNC: 3.8 MMOL/L (ref 3.6–5)
PROT SERPL-MCNC: 7.9 G/DL (ref 5.9–8.4)
RBC # BLD AUTO: 4.81 MILLION/UL (ref 4.5–5.9)
RBC MORPH BLD: NORMAL
SARS-COV-2 RNA RESP QL NAA+PROBE: NEGATIVE
SODIUM SERPL-SCNC: 136 MMOL/L (ref 137–147)
TOTAL CELLS COUNTED SPEC: 100
WBC # BLD AUTO: 10 THOUSAND/UL (ref 4.5–11)

## 2021-03-25 PROCEDURE — 36415 COLL VENOUS BLD VENIPUNCTURE: CPT | Performed by: EMERGENCY MEDICINE

## 2021-03-25 PROCEDURE — 96375 TX/PRO/DX INJ NEW DRUG ADDON: CPT

## 2021-03-25 PROCEDURE — 94640 AIRWAY INHALATION TREATMENT: CPT

## 2021-03-25 PROCEDURE — 99284 EMERGENCY DEPT VISIT MOD MDM: CPT | Performed by: EMERGENCY MEDICINE

## 2021-03-25 PROCEDURE — 80053 COMPREHEN METABOLIC PANEL: CPT | Performed by: EMERGENCY MEDICINE

## 2021-03-25 PROCEDURE — 99284 EMERGENCY DEPT VISIT MOD MDM: CPT

## 2021-03-25 PROCEDURE — 85007 BL SMEAR W/DIFF WBC COUNT: CPT | Performed by: EMERGENCY MEDICINE

## 2021-03-25 PROCEDURE — 96374 THER/PROPH/DIAG INJ IV PUSH: CPT

## 2021-03-25 PROCEDURE — 96361 HYDRATE IV INFUSION ADD-ON: CPT

## 2021-03-25 PROCEDURE — 87635 SARS-COV-2 COVID-19 AMP PRB: CPT | Performed by: EMERGENCY MEDICINE

## 2021-03-25 PROCEDURE — 85027 COMPLETE CBC AUTOMATED: CPT | Performed by: EMERGENCY MEDICINE

## 2021-03-25 PROCEDURE — 71045 X-RAY EXAM CHEST 1 VIEW: CPT

## 2021-03-25 RX ORDER — IPRATROPIUM BROMIDE AND ALBUTEROL SULFATE 2.5; .5 MG/3ML; MG/3ML
3 SOLUTION RESPIRATORY (INHALATION) ONCE
Status: COMPLETED | OUTPATIENT
Start: 2021-03-25 | End: 2021-03-25

## 2021-03-25 RX ORDER — ALBUTEROL SULFATE 90 UG/1
2 AEROSOL, METERED RESPIRATORY (INHALATION) EVERY 4 HOURS PRN
Qty: 1 INHALER | Refills: 0 | Status: SHIPPED | OUTPATIENT
Start: 2021-03-25

## 2021-03-25 RX ORDER — GUAIFENESIN 600 MG
1200 TABLET, EXTENDED RELEASE 12 HR ORAL EVERY 12 HOURS SCHEDULED
Qty: 30 TABLET | Refills: 0 | Status: SHIPPED | OUTPATIENT
Start: 2021-03-25

## 2021-03-25 RX ORDER — BENZONATATE 100 MG/1
100 CAPSULE ORAL EVERY 8 HOURS
Qty: 21 CAPSULE | Refills: 0 | Status: SHIPPED | OUTPATIENT
Start: 2021-03-25

## 2021-03-25 RX ORDER — ONDANSETRON 2 MG/ML
4 INJECTION INTRAMUSCULAR; INTRAVENOUS ONCE
Status: COMPLETED | OUTPATIENT
Start: 2021-03-25 | End: 2021-03-25

## 2021-03-25 RX ORDER — KETOROLAC TROMETHAMINE 30 MG/ML
30 INJECTION, SOLUTION INTRAMUSCULAR; INTRAVENOUS ONCE
Status: COMPLETED | OUTPATIENT
Start: 2021-03-25 | End: 2021-03-25

## 2021-03-25 RX ORDER — SODIUM CHLORIDE FOR INHALATION 0.9 %
3 VIAL, NEBULIZER (ML) INHALATION ONCE
Status: COMPLETED | OUTPATIENT
Start: 2021-03-25 | End: 2021-03-25

## 2021-03-25 RX ORDER — GUAIFENESIN 600 MG
600 TABLET, EXTENDED RELEASE 12 HR ORAL ONCE
Status: COMPLETED | OUTPATIENT
Start: 2021-03-25 | End: 2021-03-25

## 2021-03-25 RX ORDER — FLUTICASONE PROPIONATE 50 MCG
1 SPRAY, SUSPENSION (ML) NASAL DAILY
Qty: 16 G | Refills: 0 | Status: SHIPPED | OUTPATIENT
Start: 2021-03-25

## 2021-03-25 RX ORDER — BENZONATATE 100 MG/1
100 CAPSULE ORAL ONCE
Status: COMPLETED | OUTPATIENT
Start: 2021-03-25 | End: 2021-03-25

## 2021-03-25 RX ORDER — LISINOPRIL 10 MG/1
10 TABLET ORAL ONCE
Status: COMPLETED | OUTPATIENT
Start: 2021-03-25 | End: 2021-03-25

## 2021-03-25 RX ORDER — IBUPROFEN 600 MG/1
600 TABLET ORAL EVERY 6 HOURS PRN
Qty: 30 TABLET | Refills: 0 | Status: CANCELLED | OUTPATIENT
Start: 2021-03-25

## 2021-03-25 RX ADMIN — KETOROLAC TROMETHAMINE 30 MG: 30 INJECTION, SOLUTION INTRAMUSCULAR at 04:28

## 2021-03-25 RX ADMIN — ALBUTEROL SULFATE 5 MG: 2.5 SOLUTION RESPIRATORY (INHALATION) at 05:19

## 2021-03-25 RX ADMIN — ISODIUM CHLORIDE 3 ML: 0.03 SOLUTION RESPIRATORY (INHALATION) at 05:19

## 2021-03-25 RX ADMIN — SODIUM CHLORIDE 1000 ML: 0.9 INJECTION, SOLUTION INTRAVENOUS at 04:17

## 2021-03-25 RX ADMIN — GUAIFENESIN 600 MG: 600 TABLET ORAL at 04:32

## 2021-03-25 RX ADMIN — IPRATROPIUM BROMIDE AND ALBUTEROL SULFATE 3 ML: 2.5; .5 SOLUTION RESPIRATORY (INHALATION) at 04:17

## 2021-03-25 RX ADMIN — ONDANSETRON 4 MG: 2 INJECTION INTRAMUSCULAR; INTRAVENOUS at 04:17

## 2021-03-25 RX ADMIN — LISINOPRIL 10 MG: 10 TABLET ORAL at 04:32

## 2021-03-25 RX ADMIN — BENZONATATE 100 MG: 100 CAPSULE ORAL at 04:17

## 2021-03-25 RX ADMIN — IPRATROPIUM BROMIDE 0.5 MG: 0.5 SOLUTION RESPIRATORY (INHALATION) at 05:19

## 2021-03-25 NOTE — Clinical Note
Jimenez Mt was seen and treated in our emergency department on 3/25/2021  Diagnosis:     Moe    He may return on this date:     Please excuse from work as the patient was in the emergency department  If you have any questions or concerns, please don't hesitate to call        Ely Wong DO    ______________________________           _______________          _______________  Hospital Representative                              Date                                Time

## 2021-03-25 NOTE — ED PROVIDER NOTES
History  Chief Complaint   Patient presents with    Cold Like Symptoms     pt c/o nasal/chest congestion, headache      59-year-old gentleman presents with multiple complaints  He states that he had diarrhea and URI symptoms earlier in the week  He was tested for COVID on Monday with negative results  His symptoms resolved through Tuesday and Wednesday and he woke overnight tonight with congestion and cough  En route with EMS he had one episode of post-tussive vomiting  He is also complaining of a headache and was noted by EMS to be hypertensive  URI  Presenting symptoms: congestion, cough and rhinorrhea    Presenting symptoms: no fatigue, no fever and no sore throat    Severity:  Severe  Onset quality:  Gradual  Timing:  Constant  Progression:  Unchanged  Chronicity:  Recurrent  Relieved by:  Nothing  Worsened by:  Nothing  Ineffective treatments:  OTC medications (tylenol)  Associated symptoms: headaches    Associated symptoms: no arthralgias, no myalgias and no sinus pain        Prior to Admission Medications   Prescriptions Last Dose Informant Patient Reported? Taking?    Ascorbic Acid (VITAMIN C) 1000 MG tablet   Yes No   Sig: Take 1,000 mg by mouth daily   Cholecalciferol (VITAMIN D PO)   Yes No   Sig: Take by mouth   Cyanocobalamin (VITAMIN B-12 PO)   Yes No   Sig: Take by mouth   KRILL OIL PO   Yes No   Sig: Take by mouth   Multiple Vitamins-Minerals (MULTI COMPLETE PO)   Yes No   Sig: Take by mouth   benzonatate (TESSALON PERLES) 100 mg capsule   No No   Sig: Take 1 capsule (100 mg total) by mouth 3 (three) times a day as needed for cough   diphenoxylate-atropine (LOMOTIL) 2 5-0 025 mg per tablet   No No   Sig: Take 1 tablet by mouth 4 (four) times a day as needed for diarrhea   escitalopram (LEXAPRO) 10 mg tablet   Yes No   Sig: Take 10 mg by mouth   esomeprazole (NexIUM) 40 MG capsule   Yes No   Sig: Take 40 mg by mouth daily before breakfast   lisinopril (ZESTRIL) 10 mg tablet   Yes No ondansetron (ZOFRAN-ODT) 4 mg disintegrating tablet   No No   Sig: Take 1 tablet (4 mg total) by mouth every 6 (six) hours as needed for nausea or vomiting      Facility-Administered Medications: None       Past Medical History:   Diagnosis Date    Anxiety     Digestive disorder     Hypertension        Past Surgical History:   Procedure Laterality Date    COLONOSCOPY      CYST REMOVAL         Family History   Problem Relation Age of Onset    Hypertension Father      I have reviewed and agree with the history as documented  E-Cigarette/Vaping    E-Cigarette Use Never User      E-Cigarette/Vaping Substances    Nicotine No     THC No     CBD No     Flavoring No     Other No     Unknown No      Social History     Tobacco Use    Smoking status: Current Every Day Smoker     Packs/day: 1 50    Smokeless tobacco: Former User   Substance Use Topics    Alcohol use: Not Currently    Drug use: No       Review of Systems   Constitutional: Negative for activity change, chills, fatigue and fever  HENT: Positive for congestion, postnasal drip and rhinorrhea  Negative for sinus pain, sore throat and trouble swallowing  Eyes: Negative  Respiratory: Positive for cough and shortness of breath  Cardiovascular: Negative for chest pain  Gastrointestinal: Positive for diarrhea, nausea and vomiting  Negative for abdominal pain and constipation  Endocrine: Negative  Genitourinary: Negative  Musculoskeletal: Negative  Negative for arthralgias, back pain and myalgias  Skin: Negative  Allergic/Immunologic: Negative  Neurological: Positive for headaches  Negative for weakness  Hematological: Negative  Psychiatric/Behavioral: Negative  All other systems reviewed and are negative  Physical Exam  Physical Exam  Vitals signs and nursing note reviewed  Constitutional:       General: He is not in acute distress  Appearance: Normal appearance  He is well-developed   He is ill-appearing  He is not toxic-appearing or diaphoretic  HENT:      Head: Normocephalic and atraumatic  Right Ear: External ear normal       Left Ear: External ear normal       Nose: Congestion present  Mouth/Throat:      Mouth: Mucous membranes are moist       Pharynx: Oropharynx is clear  Eyes:      Conjunctiva/sclera: Conjunctivae normal       Pupils: Pupils are equal, round, and reactive to light  Neck:      Musculoskeletal: Neck supple  No neck rigidity  Cardiovascular:      Rate and Rhythm: Normal rate and regular rhythm  Heart sounds: Normal heart sounds  Pulmonary:      Effort: Pulmonary effort is normal  No respiratory distress  Breath sounds: Normal breath sounds  No stridor  No wheezing or rhonchi  Abdominal:      General: Bowel sounds are normal  There is no distension  Palpations: Abdomen is soft  Tenderness: There is no abdominal tenderness  There is no guarding  Musculoskeletal: Normal range of motion  Right lower leg: No edema  Left lower leg: No edema  Skin:     General: Skin is warm and dry  Capillary Refill: Capillary refill takes less than 2 seconds  Neurological:      General: No focal deficit present  Mental Status: He is alert and oriented to person, place, and time     Psychiatric:         Mood and Affect: Mood normal          Behavior: Behavior normal          Vital Signs  ED Triage Vitals   Temperature Pulse Respirations Blood Pressure SpO2   03/25/21 0407 03/25/21 0407 03/25/21 0407 03/25/21 0407 03/25/21 0407   (!) 97 1 °F (36 2 °C) (!) 108 20 (!) 179/81 95 %      Temp Source Heart Rate Source Patient Position - Orthostatic VS BP Location FiO2 (%)   03/25/21 0407 03/25/21 0407 03/25/21 0407 03/25/21 0407 --   Tympanic Monitor Sitting Left arm       Pain Score       03/25/21 0428       7           Vitals:    03/25/21 0407 03/25/21 0555   BP: (!) 179/81 129/61   Pulse: (!) 108 84   Patient Position - Orthostatic VS: Sitting Lying         Visual Acuity      ED Medications  Medications   sodium chloride 0 9 % bolus 1,000 mL (0 mL Intravenous Stopped 3/25/21 0552)   ipratropium-albuterol (DUO-NEB) 0 5-2 5 mg/3 mL inhalation solution 3 mL (3 mL Nebulization Given 3/25/21 0417)   ondansetron (ZOFRAN) injection 4 mg (4 mg Intravenous Given 3/25/21 0417)   guaiFENesin (MUCINEX) 12 hr tablet 600 mg (600 mg Oral Given 3/25/21 0432)   benzonatate (TESSALON PERLES) capsule 100 mg (100 mg Oral Given 3/25/21 0417)   ketorolac (TORADOL) injection 30 mg (30 mg Intravenous Given 3/25/21 0428)   lisinopril (ZESTRIL) tablet 10 mg (10 mg Oral Given 3/25/21 0432)   albuterol inhalation solution 5 mg (5 mg Nebulization Given 3/25/21 0519)     And   ipratropium (ATROVENT) 0 02 % inhalation solution 0 5 mg (0 5 mg Nebulization Given 3/25/21 0519)     And   sodium chloride 0 9 % inhalation solution 3 mL (3 mL Nebulization Given 3/25/21 0519)       Diagnostic Studies  Results Reviewed     Procedure Component Value Units Date/Time    Novel Coronavirus [facility] [[id number]] Collected: 03/25/21 0600    Lab Status:  In process Specimen: Nares from Nasopharyngeal Swab Updated: 03/25/21 0603    Manual Differential (Non Wam) [[id number]]  (Abnormal) Collected: 03/25/21 0411    Lab Status: Final result Specimen: Blood from Arm, Left Updated: 03/25/21 0447     Segmented % 42 %      Bands % 2 %      Lymphocytes % 48 %      Monocytes % 8 %      Neutrophils Absolute 4 40 Thousand/uL      Lymphocytes Absolute 4 80 Thousand/uL      Monocytes Absolute 0 80 Thousand/uL      Total Counted 100     RBC Morphology Normal     Platelet Estimate Adequate    Comprehensive metabolic panel [[id number]]  (Abnormal) Collected: 03/25/21 0412    Lab Status: Final result Specimen: Blood from Arm, Left Updated: 03/25/21 0434     Sodium 136 mmol/L      Potassium 3 8 mmol/L      Chloride 98 mmol/L      CO2 28 mmol/L      ANION GAP 10 mmol/L      BUN 14 mg/dL      Creatinine 0 61 mg/dL Glucose 288 mg/dL      Calcium 9 1 mg/dL      AST 20 U/L      ALT 30 U/L      Alkaline Phosphatase 120 U/L      Total Protein 7 9 g/dL      Albumin 4 6 g/dL      Total Bilirubin 0 50 mg/dL      eGFR 134 ml/min/1 73sq m     Narrative:      Fuentes guidelines for Chronic Kidney Disease (CKD):     Stage 1 with normal or high GFR (GFR > 90 mL/min/1 73 square meters)    Stage 2 Mild CKD (GFR = 60-89 mL/min/1 73 square meters)    Stage 3A Moderate CKD (GFR = 45-59 mL/min/1 73 square meters)    Stage 3B Moderate CKD (GFR = 30-44 mL/min/1 73 square meters)    Stage 4 Severe CKD (GFR = 15-29 mL/min/1 73 square meters)    Stage 5 End Stage CKD (GFR <15 mL/min/1 73 square meters)  Note: GFR calculation is accurate only with a steady state creatinine    CBC and differential [[id number]]  (Abnormal) Collected: 03/25/21 0411    Lab Status: Final result Specimen: Blood from Arm, Left Updated: 03/25/21 0428     WBC 10 00 Thousand/uL      RBC 4 81 Million/uL      Hemoglobin 15 5 g/dL      Hematocrit 45 3 %      MCV 94 fL      MCH 32 3 pg      MCHC 34 3 g/dL      RDW 12 8 %      MPV 6 8 fL      Platelets 195 Thousands/uL     UA (URINE) with reflex to Scope [[id number]]     Lab Status: No result Specimen: Urine                  XR chest 1 view portable    (Results Pending)              Procedures  Procedures         ED Course  ED Course as of Mar 25 0603   Thu Mar 25, 2021   0521 Vitals improved  Symptoms improved but he continues to have some dyspnea  Will give an additional neb and reassess  6943 Patient is now requesting a repeat covid test                                 SBIRT 20yo+      Most Recent Value   SBIRT (24 yo +)   In order to provide better care to our patients, we are screening all of our patients for alcohol and drug use  Would it be okay to ask you these screening questions? Yes Filed at: 03/25/2021 1250   Initial Alcohol Screen: US AUDIT-C    1   How often do you have a drink containing alcohol?  0 Filed at: 03/25/2021 0434   2  How many drinks containing alcohol do you have on a typical day you are drinking? 0 Filed at: 03/25/2021 0434   3a  Male UNDER 65: How often do you have five or more drinks on one occasion? 0 Filed at: 03/25/2021 0434   Audit-C Score  0 Filed at: 03/25/2021 2843   HALEY: How many times in the past year have you    Used an illegal drug or used a prescription medication for non-medical reasons? Never Filed at: 03/25/2021 0434                    MDM    Disposition  Final diagnoses:   URI (upper respiratory infection)   Cough   Vomiting     Time reflects when diagnosis was documented in both MDM as applicable and the Disposition within this note     Time User Action Codes Description Comment    3/25/2021  5:26 AM Sabrina Mcardle Add [J06 9] URI (upper respiratory infection)     3/25/2021  5:26 AM Sabrina Mcardle Add [R05] Cough     3/25/2021  5:26 AM Sabrina Mcardle Add [R11 10] Vomiting       ED Disposition     ED Disposition Condition Date/Time Comment    Discharge Stable Thu Mar 25, 2021  5:25 AM Dorothy Lance discharge to home/self care              Follow-up Information     Follow up With Specialties Details Why Contact Info Additional 40 [facility],  Internal Medicine Call   [address] [place] [id number] [phone]       [facility] Emergency Department Emergency Medicine  If symptoms worsen [address] [facility] [id number]  [facility]          Patient's Medications   Discharge Prescriptions    ALBUTEROL (PROVENTIL HFA,VENTOLIN HFA) 90 MCG/ACT INHALER    Inhale 2 puffs every 4 (four) hours as needed for wheezing       Start Date: 3/25/2021 End Date: --       Order Dose: 2 puffs       Quantity: 1 Inhaler    Refills: 0    BENZONATATE (TESSALON PERLES) 100 MG CAPSULE    Take 1 capsule (100 mg total) by mouth every 8 (eight) hours       Start Date: 3/25/2021 End Date: --       Order Dose: 100 mg       Quantity: 21 capsule    Refills: 0    FLUTICASONE (FLONASE) 50 MCG/ACT NASAL SPRAY    1 spray into each nostril daily       Start Date: 3/25/2021 End Date: --       Order Dose: 1 spray       Quantity: 16 g    Refills: 0    GUAIFENESIN (MUCINEX) 600 MG 12 HR TABLET    Take 2 tablets (1,200 mg total) by mouth every 12 (twelve) hours       Start Date: 3/25/2021 End Date: --       Order Dose: 1,200 mg       Quantity: 30 tablet    Refills: 0    MENTHOL-CETYLPYRIDINIUM (CEPACOL) 3 MG LOZENGE    Take 1 lozenge (3 mg total) by mouth as needed for sore throat       Start Date: 3/25/2021 End Date: --       Order Dose: 3 mg       Quantity: 9 lozenge    Refills: 0     No discharge procedures on file      PDMP Review     None          ED Provider  Electronically Signed by           Juana Hobbs DO  03/25/21 9033

## 2021-03-25 NOTE — Clinical Note
Charlotte Macedo was seen and treated in our emergency department on 3/25/2021  Diagnosis:     Moe    He may return on this date:     Please excuse from work today and tomorrow as the patient was in the emergency department  If you have any questions or concerns, please don't hesitate to call        Dasia Louise, DO    ______________________________           _______________          _______________  Hospital Representative                              Date                                Time

## 2021-03-30 DIAGNOSIS — Z23 ENCOUNTER FOR IMMUNIZATION: ICD-10-CM

## 2025-08-01 ENCOUNTER — APPOINTMENT (OUTPATIENT)
Dept: LAB | Age: 35
End: 2025-08-01
Attending: STUDENT IN AN ORGANIZED HEALTH CARE EDUCATION/TRAINING PROGRAM

## 2025-08-01 ENCOUNTER — OCCMED (OUTPATIENT)
Dept: URGENT CARE | Age: 35
End: 2025-08-01

## 2025-08-01 DIAGNOSIS — Z02.1 PRE-EMPLOYMENT HEALTH SCREENING EXAMINATION: ICD-10-CM

## 2025-08-01 DIAGNOSIS — Z02.1 PRE-EMPLOYMENT HEALTH SCREENING EXAMINATION: Primary | ICD-10-CM

## 2025-08-01 PROCEDURE — 86480 TB TEST CELL IMMUN MEASURE: CPT

## 2025-08-01 PROCEDURE — 36415 COLL VENOUS BLD VENIPUNCTURE: CPT

## 2025-08-02 LAB
GAMMA INTERFERON BACKGROUND BLD IA-ACNC: 0.15 IU/ML
M TB IFN-G BLD-IMP: NEGATIVE
M TB IFN-G CD4+ BCKGRND COR BLD-ACNC: 0.02 IU/ML
M TB IFN-G CD4+ BCKGRND COR BLD-ACNC: 0.06 IU/ML
MITOGEN IGNF BCKGRD COR BLD-ACNC: 9.85 IU/ML